# Patient Record
Sex: MALE | Race: WHITE | NOT HISPANIC OR LATINO
[De-identification: names, ages, dates, MRNs, and addresses within clinical notes are randomized per-mention and may not be internally consistent; named-entity substitution may affect disease eponyms.]

---

## 2024-04-26 PROBLEM — Z00.00 ENCOUNTER FOR PREVENTIVE HEALTH EXAMINATION: Status: ACTIVE | Noted: 2024-04-26

## 2024-04-29 ENCOUNTER — APPOINTMENT (OUTPATIENT)
Dept: ORTHOPEDIC SURGERY | Facility: CLINIC | Age: 54
End: 2024-04-29
Payer: COMMERCIAL

## 2024-04-29 VITALS — HEIGHT: 69 IN | BODY MASS INDEX: 26.36 KG/M2 | WEIGHT: 178 LBS

## 2024-04-29 DIAGNOSIS — Z78.9 OTHER SPECIFIED HEALTH STATUS: ICD-10-CM

## 2024-04-29 DIAGNOSIS — J44.9 CHRONIC OBSTRUCTIVE PULMONARY DISEASE, UNSPECIFIED: ICD-10-CM

## 2024-04-29 DIAGNOSIS — Z86.79 PERSONAL HISTORY OF OTHER DISEASES OF THE CIRCULATORY SYSTEM: ICD-10-CM

## 2024-04-29 DIAGNOSIS — E78.00 PURE HYPERCHOLESTEROLEMIA, UNSPECIFIED: ICD-10-CM

## 2024-04-29 DIAGNOSIS — M65.30 TRIGGER FINGER, UNSPECIFIED FINGER: ICD-10-CM

## 2024-04-29 DIAGNOSIS — I10 ESSENTIAL (PRIMARY) HYPERTENSION: ICD-10-CM

## 2024-04-29 DIAGNOSIS — F17.200 NICOTINE DEPENDENCE, UNSPECIFIED, UNCOMPLICATED: ICD-10-CM

## 2024-04-29 PROCEDURE — 99204 OFFICE O/P NEW MOD 45 MIN: CPT | Mod: 25

## 2024-04-29 PROCEDURE — 20550 NJX 1 TENDON SHEATH/LIGAMENT: CPT | Mod: 50

## 2024-04-29 NOTE — ASSESSMENT
[FreeTextEntry1] : EXAM Right hand with no swelling or erythema. Able to make fist, oppose thumb to small finger with good thenar bulk. No intrinsic atrophy. Tender along distal palmar crease, most notable at middle finger. No overt locking with catching palpable. Sensation intact throughout with <2sec cap refill.     Left hand with no swelling or erythema. Able to make fist, oppose thumb to small finger with good thenar bulk. No intrinsic atrophy. Tender along distal palmar crease, most notable at middle finger. No overt locking with catching palpable. Sensation intact throughout with <2sec cap refill.     ASSESSMENT & PLAN Bilateral middle finger trigger - Discussed with patient the pathoanatomy of trigger and options going forward. Risks/benefits discussed as well as natural progression that injection will provide some relief but symptoms may recur. Discussed that pain may worsen in coming days but will subside. Discussed that we can repeat an injection or that operative intervention may be indicated down the line. After discussion of risks/benefits, including glucose intolerance in the diabetic population, patient elected to proceed with CSI to the A1 pulley.  Procedure 1 - 0.5cc 1% lidocaine + 0.5cc 40mg/cc Kenalog administered to the right middle finger A1 pulley following sterilization with Betadine. Patient tolerated this well.   F/u 3months

## 2024-04-29 NOTE — HISTORY OF PRESENT ILLNESS
[de-identified] : Age: 53 year M PMHx: HTN, HLD, CAD, COPD, Right hand surgery (4th and 5th metacarpal fractures) Hand Dominance: RHD Chief Complaint: Bilateral hand pain onset 2022 with NKI. Patient reports his symptoms onset with no precipitating factors. Patient reports that his bilateral middle fingers are triggering, clicking and sticking. Patient taking Aleve as needed with relief. Reports some intermittent numbness in the distal aspect of his right middle finger.  Trauma: NKI Outside Imaging/Treatment: none OTC Medications: Aleve with some relief OT/PT: none Bracing: none Pain worse with: exertion, making a fist Pain better with: rest